# Patient Record
Sex: FEMALE | Race: BLACK OR AFRICAN AMERICAN
[De-identification: names, ages, dates, MRNs, and addresses within clinical notes are randomized per-mention and may not be internally consistent; named-entity substitution may affect disease eponyms.]

---

## 2020-10-12 ENCOUNTER — HOSPITAL ENCOUNTER (EMERGENCY)
Dept: HOSPITAL 57 - BURERS | Age: 65
Discharge: HOME | End: 2020-10-12
Payer: SELF-PAY

## 2020-10-12 DIAGNOSIS — F17.210: ICD-10-CM

## 2020-10-12 DIAGNOSIS — S80.01XA: ICD-10-CM

## 2020-10-12 DIAGNOSIS — M25.552: ICD-10-CM

## 2020-10-12 DIAGNOSIS — S82.832A: Primary | ICD-10-CM
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
## 2020-10-12 PROCEDURE — 29515 APPLICATION SHORT LEG SPLINT: CPT

## 2020-10-12 PROCEDURE — 96372 THER/PROPH/DIAG INJ SC/IM: CPT

## 2020-10-12 NOTE — RAD
LEFT HIP TWO VIEWS:

 

Date: 10-

 

FINDINGS: 

No fracture was seen. Osteophytes are seen around the joint. The joint space is only mildly narrowed 
and the articular surfaces are smooth. No bony destructive lesions were seen. There may be some arthr
itic changes in the lower left SI joint. 

 

IMPRESSION: 

Mild to moderate osteoarthritis of the hip.

 

POS: HOME

## 2020-10-12 NOTE — RAD
LEFT KNEE FOUR VIEWS:

 

Date: 10-

 

FINDINGS: 

No fracture or joint effusion was seen. Medial joint space narrowing and osteophytes are present, typ
ical of osteoarthritis. No bony destructive lesions were seen. 

 

IMPRESSION: 

Medial compartment osteoarthritis. 

 

POS: HOME

## 2020-10-12 NOTE — RAD
RIGHT KNEE FOUR VIEWS:

 

Date: 10-

 

Comparison: Films of the left knee. 

 

FINDINGS: 

Osteoarthritis is present in this knee as well, though it is more severe. Calcification is seen in me
nisci and other cartilage. There is slight joint space narrowing in each compartment. No effusions or
 fractures were seen. 

 

IMPRESSION: 

Moderate osteoarthritis.

 

POS: HOME

## 2020-10-12 NOTE — RAD
LEFT ANKLE THREE VIEWS:

 

Date: 10-

 

FINDINGS: 

An oblique fracture of the lower fibular shaft is present without displacement. There is considerable
 soft tissue swelling around the joint. Some bony irregularity at the tip of the medial malleolus is 
probably old. The articular surfaces are smooth. 

 

IMPRESSION: 

Acute nondisplaced fracture of the distal fibula. 

 

Code T

 

POS: HOME

## 2020-10-17 ENCOUNTER — HOSPITAL ENCOUNTER (EMERGENCY)
Dept: HOSPITAL 57 - BURERS | Age: 65
Discharge: HOME | End: 2020-10-17
Payer: COMMERCIAL

## 2020-10-17 DIAGNOSIS — S82.832A: Primary | ICD-10-CM

## 2020-10-17 DIAGNOSIS — F17.210: ICD-10-CM

## 2020-10-17 PROCEDURE — 99283 EMERGENCY DEPT VISIT LOW MDM: CPT

## 2023-05-15 ENCOUNTER — HOSPITAL ENCOUNTER (EMERGENCY)
Dept: HOSPITAL 57 - BURERS | Age: 68
Discharge: HOME | End: 2023-05-15
Payer: COMMERCIAL

## 2023-05-15 DIAGNOSIS — M17.11: Primary | ICD-10-CM

## 2023-05-15 DIAGNOSIS — F17.210: ICD-10-CM

## 2023-05-15 PROCEDURE — 20610 DRAIN/INJ JOINT/BURSA W/O US: CPT

## 2024-09-18 ENCOUNTER — HOSPITAL ENCOUNTER (OUTPATIENT)
Dept: HOSPITAL 92 - SDC | Age: 69
Setting detail: OBSERVATION
LOS: 1 days | Discharge: HOME | End: 2024-09-19
Attending: ORTHOPAEDIC SURGERY | Admitting: ORTHOPAEDIC SURGERY
Payer: COMMERCIAL

## 2024-09-18 VITALS — BODY MASS INDEX: 32.1 KG/M2

## 2024-09-18 DIAGNOSIS — F17.200: ICD-10-CM

## 2024-09-18 DIAGNOSIS — I10: ICD-10-CM

## 2024-09-18 DIAGNOSIS — M17.0: Primary | ICD-10-CM

## 2024-09-18 DIAGNOSIS — M21.961: ICD-10-CM

## 2024-09-18 PROCEDURE — 27447 TOTAL KNEE ARTHROPLASTY: CPT

## 2024-09-18 PROCEDURE — 0055T BONE SRGRY CMPTR CT/MRI IMAG: CPT

## 2024-09-18 PROCEDURE — G0378 HOSPITAL OBSERVATION PER HR: HCPCS

## 2024-09-18 PROCEDURE — C1889 IMPLANT/INSERT DEVICE, NOC: HCPCS

## 2024-09-18 PROCEDURE — 85027 COMPLETE CBC AUTOMATED: CPT

## 2024-09-18 PROCEDURE — 96375 TX/PRO/DX INJ NEW DRUG ADDON: CPT

## 2024-09-18 PROCEDURE — 96374 THER/PROPH/DIAG INJ IV PUSH: CPT

## 2024-09-18 PROCEDURE — A4306 DRUG DELIVERY SYSTEM <=50 ML: HCPCS

## 2024-09-18 PROCEDURE — C1776 JOINT DEVICE (IMPLANTABLE): HCPCS

## 2024-09-18 PROCEDURE — 36415 COLL VENOUS BLD VENIPUNCTURE: CPT

## 2024-09-18 PROCEDURE — 96376 TX/PRO/DX INJ SAME DRUG ADON: CPT

## 2024-09-18 PROCEDURE — C1713 ANCHOR/SCREW BN/BN,TIS/BN: HCPCS

## 2024-09-18 RX ADMIN — VANCOMYCIN HYDROCHLORIDE SCH MLS: 10 INJECTION, POWDER, LYOPHILIZED, FOR SOLUTION INTRAVENOUS at 20:55

## 2024-09-18 RX ADMIN — KETOROLAC TROMETHAMINE SCH: 30 INJECTION, SOLUTION INTRAMUSCULAR at 12:39

## 2024-09-18 RX ADMIN — ASPIRIN SCH MG: 81 TABLET ORAL at 20:59

## 2024-09-18 RX ADMIN — DOCUSATE SODIUM 50 MG AND SENNOSIDES 8.6 MG SCH TAB: 8.6; 5 TABLET, FILM COATED ORAL at 20:59

## 2024-09-19 VITALS — SYSTOLIC BLOOD PRESSURE: 126 MMHG | DIASTOLIC BLOOD PRESSURE: 70 MMHG | TEMPERATURE: 98.5 F

## 2024-09-19 LAB
HCT VFR BLD CALC: 31.6 % (ref 36–47)
HGB BLD-MCNC: 10.3 G/DL (ref 12–16)
MCH RBC QN AUTO: 29.7 PG (ref 27–31)
MCV RBC AUTO: 91.1 FL (ref 78–98)
PLATELET # BLD AUTO: 247 10X3/UL (ref 130–400)
RBC # BLD AUTO: 3.47 MILL/UL (ref 4.2–5.4)
WBC # BLD AUTO: 11.9 10X3/UL (ref 4.8–10.8)

## 2024-09-19 PROCEDURE — 0SRC0JZ REPLACEMENT OF RIGHT KNEE JOINT WITH SYNTHETIC SUBSTITUTE, OPEN APPROACH: ICD-10-PCS | Performed by: ORTHOPAEDIC SURGERY

## 2024-09-19 RX ADMIN — HYDROCODONE BITARTRATE AND ACETAMINOPHEN PRN TAB: 10; 325 TABLET ORAL at 09:16

## 2024-09-19 RX ADMIN — MULTIPLE VITAMINS W/ MINERALS TAB SCH TAB: TAB at 09:16
